# Patient Record
Sex: FEMALE | URBAN - METROPOLITAN AREA
[De-identification: names, ages, dates, MRNs, and addresses within clinical notes are randomized per-mention and may not be internally consistent; named-entity substitution may affect disease eponyms.]

---

## 2020-09-17 ENCOUNTER — VIRTUAL VISIT (OUTPATIENT)
Dept: FAMILY MEDICINE CLINIC | Age: 35
End: 2020-09-17

## 2020-09-17 DIAGNOSIS — R53.83 FATIGUE, UNSPECIFIED TYPE: ICD-10-CM

## 2020-09-17 DIAGNOSIS — F41.9 ANXIETY: Primary | ICD-10-CM

## 2020-09-17 PROBLEM — Z79.899 LONG-TERM CURRENT USE OF LITHIUM: Status: ACTIVE | Noted: 2019-07-10

## 2020-09-17 PROBLEM — F31.9 BIPOLAR DISORDER WITH DEPRESSION (HCC): Status: ACTIVE | Noted: 2019-07-01

## 2020-09-17 PROBLEM — N63.20 MASSES OF BOTH BREASTS: Status: ACTIVE | Noted: 2017-09-12

## 2020-09-17 PROBLEM — N63.10 MASSES OF BOTH BREASTS: Status: ACTIVE | Noted: 2017-09-12

## 2020-09-17 RX ORDER — BUPROPION HYDROCHLORIDE 150 MG/1
150 TABLET, EXTENDED RELEASE ORAL DAILY
COMMUNITY
Start: 2019-10-30 | End: 2020-10-30

## 2020-09-17 RX ORDER — QUETIAPINE FUMARATE 100 MG/1
200 TABLET, FILM COATED ORAL AT BEDTIME
COMMUNITY
Start: 2020-03-30 | End: 2020-09-27

## 2020-09-17 RX ORDER — LAMOTRIGINE 200 MG/1
200 TABLET ORAL DAILY
COMMUNITY
Start: 2020-03-12

## 2020-09-17 RX ORDER — BUSPIRONE HYDROCHLORIDE 5 MG/1
5 TABLET ORAL 2 TIMES DAILY
Qty: 60 TAB | Refills: 0 | Status: SHIPPED | OUTPATIENT
Start: 2020-09-17

## 2020-09-17 RX ORDER — LORAZEPAM 0.5 MG/1
0.5 TABLET ORAL
COMMUNITY

## 2020-09-17 RX ORDER — HYDROXYZINE 50 MG/1
50 TABLET, FILM COATED ORAL
Qty: 90 TAB | Refills: 1 | Status: SHIPPED | OUTPATIENT
Start: 2020-09-17

## 2020-09-17 RX ORDER — FLUOXETINE HYDROCHLORIDE 40 MG/1
40 CAPSULE ORAL DAILY
COMMUNITY
Start: 2019-10-30 | End: 2020-10-30

## 2020-09-17 RX ORDER — ALPRAZOLAM 0.5 MG/1
.5-1 TABLET, EXTENDED RELEASE ORAL
COMMUNITY
Start: 2020-09-02 | End: 2020-10-03

## 2020-09-17 NOTE — PROGRESS NOTES
1st attempt at this scheduled time-2:14pm- No answer. Left message to return call for check in prior to virtual appointment at 3 pm    1. Have you been to the ER, urgent care clinic since your last visit? Hospitalized since your last visit? No    2. Have you seen or consulted any other health care providers outside of the [address] [place] [address] since your last visit? Include any pap smears or colon screening.  No

## 2020-09-17 NOTE — PROGRESS NOTES
[facility]    CC: EOC for Anxiety    HPI:     Michael Chaparro, who was evaluated through a synchronous (real-time) audio-video encounter, and/or her healthcare decision maker, is aware that it is a billable service, with coverage as determined by her insurance carrier. She provided verbal consent to proceed: Yes, and patient identification was verified. It was conducted pursuant to the emergency declaration under the [address] [place] [address] and the [employer] General Act. A caregiver was present when appropriate. Ability to conduct physical exam was limited. I was at home. The patient was at home. Anxiety:  -Issue since she was a teenager  -First started on medication at 15 years  -She is seeing a psychiatrist in [place]  -Currently taking her psychiatric medications  -Denies any side effects or issues with her medications  -States that medications work well to control her mood  -Reports recent stress due to moving that has aggravates the issue      ROS: Positive items marked in RED  CON: fever, chills, fatigue  Cardiovascular: palpitations, CP  Resp: SOB, cough  GI: nausea, vomiting, diarrhea  : dysuria, hematuria      Past Medical History:   Diagnosis Date    Anxiety     Bipolar depression ([facility] [address]     Was suspected    Psoriasis of scalp     Suspected diagnosis by dermatologist       Past Surgical History:   Procedure Laterality Date    HX WISDOM TEETH EXTRACTION      wisdom teeth removal x 4       Family History   Problem Relation Age of Onset    Depression Mother    Zada Sprain Anxiety Mother     Depression Father     Anxiety Father     Breast Cancer Paternal Grandmother        Social History     Tobacco Use    Smoking status: Never Smoker    Smokeless tobacco: Never Used   Substance Use Topics    Alcohol use:  Yes     Alcohol/week: 6.0 standard drinks     Types: 3 Cans of beer, 3 Glasses of wine per week    Drug use: Never       No Known Allergies      Current Outpatient Medications:     QUEtiapine (SEROquel) 100 mg tablet, Take 200 mg by mouth At bedtime. , Disp: , Rfl:     ALPRAZolam (XANAX XR) 0.5 mg XR tablet, Take 0.5-1 mg by mouth two (2) times daily as needed. , Disp: , Rfl:     FLUoxetine (PROzac) 40 mg capsule, Take 40 mg by mouth daily. , Disp: , Rfl:     buPROPion SR (WELLBUTRIN SR) 150 mg SR tablet, Take 150 mg by mouth daily. , Disp: , Rfl:     lamoTRIgine (LaMICtal) 200 mg tablet, Take 200 mg by mouth daily. , Disp: , Rfl:     LORazepam (ATIVAN) 0.5 mg tablet, Take 0.5 mg by mouth daily as needed for Anxiety. , Disp: , Rfl:     Physical Exam:      General: WD, WN, NAD, conversant  Eyes: sclera clear bilaterally, no discharge noted, eyelids normal in appearance, EOMI  HENT: NCAT  Neck: no masses visualized, trachea appears to be midline  Lungs: normal respiratory effort and rate, No visualized signs of difficulty breathing or respiratory distress  MS: normal AROM of neck noted  Skin: no significant exanthematous lesions or discoloration noted on neck/facial skin    Psych: alert and oriented to person, place and situation, normal affect  Neuro: speech normal, moving all upper extremities, no gaze palsy, no facial asymmetry (Cranial nerve 7 motor function) (limited exam due to video visit)      Assessment/Plan       Anxiety:  -Advised that I do not prescribed benzodiazepines for management of anxiety  -Given information on how to locate a local mental health professional that accepts her insurance  -Started on trial of BuSpar and hydroxyzine  -Follow-up in 1 month      Fatigue:  -Etiology unclear, but suspect it may be related to her stress  -DDx includes depression, anemia, hypothyroidism, and vitamin D deficiency  -CBC, TSH, free T4, and vitamin D ordered  -Follow-up in 1 month      Mayra Lackey is a 29 y.o. female being evaluated by a Virtual Visit (video visit) encounter to address concerns as mentioned above. A caregiver was present when appropriate. Due to this being a TeleHealth encounter (During TWWQY-53 public health emergency), evaluation of the following organ systems was limited: Vitals/Constitutional/EENT/Resp/CV/GI//MS/Neuro/Skin/Heme-Lymph-Imm. Pursuant to the emergency declaration under the [address] [place] [address], [address] [place] [address] and the [employer] Act, this Virtual Visit was conducted with patient's (and/or legal guardian's) consent, to reduce the risk of exposure to COVID-19 and provide necessary medical care. Services were provided through a video synchronous discussion virtually to substitute for in-person encounter. --Scot Daley MD on 9/17/2020 at 3:08 PM    An electronic signature was used to authenticate this note.

## 2020-09-17 NOTE — PROGRESS NOTES
1st attempt- went straight to voice mail. Left message to return call for check in prior to virtual appointment at 1:15pm.      2nd attempt- no answer. left message again    Patient sent my chart message to call office for pre check in prior to virtual visit @ 1:15pm  This encounter was created in error - please disregard.